# Patient Record
Sex: FEMALE | Race: OTHER | Employment: UNEMPLOYED | URBAN - METROPOLITAN AREA
[De-identification: names, ages, dates, MRNs, and addresses within clinical notes are randomized per-mention and may not be internally consistent; named-entity substitution may affect disease eponyms.]

---

## 2017-04-12 ENCOUNTER — APPOINTMENT (OUTPATIENT)
Dept: CT IMAGING | Facility: HOSPITAL | Age: 72
DRG: 065 | End: 2017-04-12
Attending: EMERGENCY MEDICINE

## 2017-04-12 ENCOUNTER — APPOINTMENT (OUTPATIENT)
Dept: GENERAL RADIOLOGY | Facility: HOSPITAL | Age: 72
DRG: 065 | End: 2017-04-12
Attending: EMERGENCY MEDICINE

## 2017-04-12 ENCOUNTER — HOSPITAL ENCOUNTER (INPATIENT)
Facility: HOSPITAL | Age: 72
LOS: 6 days | Discharge: NURSING, REHAB OR LONG-TERM CARE | DRG: 065 | End: 2017-04-18
Attending: EMERGENCY MEDICINE | Admitting: HOSPITALIST

## 2017-04-12 DIAGNOSIS — R73.9 HYPERGLYCEMIA: ICD-10-CM

## 2017-04-12 DIAGNOSIS — N17.9 ACUTE RENAL FAILURE, UNSPECIFIED ACUTE RENAL FAILURE TYPE (HCC): ICD-10-CM

## 2017-04-12 DIAGNOSIS — I63.9 ACUTE CVA (CEREBROVASCULAR ACCIDENT) (HCC): Primary | ICD-10-CM

## 2017-04-12 PROCEDURE — 99223 1ST HOSP IP/OBS HIGH 75: CPT | Performed by: HOSPITALIST

## 2017-04-12 PROCEDURE — 71010 XR CHEST AP PORTABLE  (CPT=71010): CPT

## 2017-04-12 PROCEDURE — 70450 CT HEAD/BRAIN W/O DYE: CPT

## 2017-04-12 RX ORDER — ONDANSETRON 2 MG/ML
4 INJECTION INTRAMUSCULAR; INTRAVENOUS EVERY 6 HOURS PRN
Status: DISCONTINUED | OUTPATIENT
Start: 2017-04-12 | End: 2017-04-18

## 2017-04-12 RX ORDER — ACETAMINOPHEN 325 MG/1
650 TABLET ORAL EVERY 4 HOURS PRN
Status: DISCONTINUED | OUTPATIENT
Start: 2017-04-12 | End: 2017-04-18

## 2017-04-12 RX ORDER — ASPIRIN 300 MG
300 SUPPOSITORY, RECTAL RECTAL ONCE
Status: COMPLETED | OUTPATIENT
Start: 2017-04-12 | End: 2017-04-12

## 2017-04-12 RX ORDER — ACETAMINOPHEN 650 MG/1
650 SUPPOSITORY RECTAL EVERY 4 HOURS PRN
Status: DISCONTINUED | OUTPATIENT
Start: 2017-04-12 | End: 2017-04-18

## 2017-04-12 RX ORDER — SODIUM PHOSPHATE, DIBASIC AND SODIUM PHOSPHATE, MONOBASIC 7; 19 G/133ML; G/133ML
1 ENEMA RECTAL ONCE AS NEEDED
Status: ACTIVE | OUTPATIENT
Start: 2017-04-12 | End: 2017-04-12

## 2017-04-12 RX ORDER — HEPARIN SODIUM 5000 [USP'U]/ML
5000 INJECTION, SOLUTION INTRAVENOUS; SUBCUTANEOUS EVERY 12 HOURS
Status: DISCONTINUED | OUTPATIENT
Start: 2017-04-12 | End: 2017-04-18

## 2017-04-12 RX ORDER — POLYETHYLENE GLYCOL 3350 17 G/17G
17 POWDER, FOR SOLUTION ORAL DAILY PRN
Status: DISCONTINUED | OUTPATIENT
Start: 2017-04-12 | End: 2017-04-18

## 2017-04-12 RX ORDER — SODIUM CHLORIDE 9 MG/ML
INJECTION, SOLUTION INTRAVENOUS CONTINUOUS
Status: DISCONTINUED | OUTPATIENT
Start: 2017-04-12 | End: 2017-04-13

## 2017-04-12 RX ORDER — NIFEDIPINE 60 MG/1
60 TABLET, FILM COATED, EXTENDED RELEASE ORAL DAILY
Status: ON HOLD | COMMUNITY
End: 2017-04-18

## 2017-04-12 RX ORDER — DOCUSATE SODIUM 100 MG/1
100 CAPSULE, LIQUID FILLED ORAL 2 TIMES DAILY
Status: DISCONTINUED | OUTPATIENT
Start: 2017-04-12 | End: 2017-04-18

## 2017-04-12 RX ORDER — ONDANSETRON 2 MG/ML
4 INJECTION INTRAMUSCULAR; INTRAVENOUS EVERY 6 HOURS PRN
Status: DISCONTINUED | OUTPATIENT
Start: 2017-04-12 | End: 2017-04-12

## 2017-04-12 RX ORDER — BISACODYL 10 MG
10 SUPPOSITORY, RECTAL RECTAL
Status: DISCONTINUED | OUTPATIENT
Start: 2017-04-12 | End: 2017-04-18

## 2017-04-12 NOTE — PROGRESS NOTES
04/12/17 1733   Clinical Encounter Type   Visited With Family   Continue Visiting Yes   Crisis Visit (Stroke Alert)   Patient's Supportive Strategies/Resources Daughter-in-law    Family Spiritual Encounters   Family Coping Anxiety; Fearful;Open/discussio

## 2017-04-12 NOTE — ED PROVIDER NOTES
Patient Seen in: [facility] AND [facility] Emergency Department    History   Patient presents with:  Stroke (neurologic)    Stated Complaint: stroke alert    HPI    Pt is 71 yo Setswana speaking Female who presents from home with left-sided weakness and slurred s 04/12/17 1734 None (Room air)       Current:/66 mmHg  Pulse 88  Temp(Src) 97.3 °F (36.3 °C) (Temporal)  Resp 18  Ht 162.6 cm (5' 4\")  Wt 75.3 kg  BMI 28.48 kg/m2  SpO2 98%        Physical Exam   Constitutional: She appears well-developed and well-no Abnormality         Status                     ---------                               -----------         ------                     CBC W/ DIFFERENTIAL[[id number]]          Abnormal            Final result                 Please view results for these sylvie follow-up provider specified. Medications Prescribed:  There are no discharge medications for this patient.       Present on Admission           ICD-10-CM Noted POA    Acute CVA (cerebrovascular accident) ([address] I63.9 4/12/2017 Unknown

## 2017-04-12 NOTE — ED INITIAL ASSESSMENT (HPI)
Pt presents to er via medics with c/o left sided weakness and slurred speech that began 2 hours pta.

## 2017-04-12 NOTE — ED NOTES
Pt interacting normally with family without difficulty. No new neuro deficits noted at this time. Pt able to move left arm up more but seems still weaker than the right. No pain or distress seen. Fall precautions maintained.   Awaiting new room assignme

## 2017-04-13 ENCOUNTER — APPOINTMENT (OUTPATIENT)
Dept: MRI IMAGING | Facility: HOSPITAL | Age: 72
DRG: 065 | End: 2017-04-13
Attending: HOSPITALIST

## 2017-04-13 ENCOUNTER — APPOINTMENT (OUTPATIENT)
Dept: CV DIAGNOSTICS | Facility: HOSPITAL | Age: 72
DRG: 065 | End: 2017-04-13
Attending: HOSPITALIST

## 2017-04-13 PROCEDURE — 99233 SBSQ HOSP IP/OBS HIGH 50: CPT | Performed by: HOSPITALIST

## 2017-04-13 PROCEDURE — 70549 MR ANGIOGRAPH NECK W/O&W/DYE: CPT

## 2017-04-13 PROCEDURE — 99232 SBSQ HOSP IP/OBS MODERATE 35: CPT | Performed by: INTERNAL MEDICINE

## 2017-04-13 PROCEDURE — 93307 TTE W/O DOPPLER COMPLETE: CPT

## 2017-04-13 PROCEDURE — 93307 TTE W/O DOPPLER COMPLETE: CPT | Performed by: INTERNAL MEDICINE

## 2017-04-13 PROCEDURE — 70551 MRI BRAIN STEM W/O DYE: CPT

## 2017-04-13 PROCEDURE — 70544 MR ANGIOGRAPHY HEAD W/O DYE: CPT

## 2017-04-13 PROCEDURE — 99255 IP/OBS CONSLTJ NEW/EST HI 80: CPT | Performed by: OTHER

## 2017-04-13 RX ORDER — NIFEDIPINE 60 MG/1
60 TABLET, EXTENDED RELEASE ORAL ONCE
Status: COMPLETED | OUTPATIENT
Start: 2017-04-13 | End: 2017-04-13

## 2017-04-13 RX ORDER — ACETAMINOPHEN 325 MG/1
650 TABLET ORAL EVERY 6 HOURS PRN
Status: DISCONTINUED | OUTPATIENT
Start: 2017-04-13 | End: 2017-04-18

## 2017-04-13 RX ORDER — DEXTROSE MONOHYDRATE 25 G/50ML
50 INJECTION, SOLUTION INTRAVENOUS AS NEEDED
Status: DISCONTINUED | OUTPATIENT
Start: 2017-04-13 | End: 2017-04-18

## 2017-04-13 RX ORDER — ATORVASTATIN CALCIUM 40 MG/1
40 TABLET, FILM COATED ORAL NIGHTLY
Status: DISCONTINUED | OUTPATIENT
Start: 2017-04-13 | End: 2017-04-13 | Stop reason: DRUGHIGH

## 2017-04-13 RX ORDER — ASPIRIN 300 MG
300 SUPPOSITORY, RECTAL RECTAL DAILY
Status: DISCONTINUED | OUTPATIENT
Start: 2017-04-13 | End: 2017-04-14

## 2017-04-13 RX ORDER — ATORVASTATIN CALCIUM 40 MG/1
80 TABLET, FILM COATED ORAL NIGHTLY
Status: DISCONTINUED | OUTPATIENT
Start: 2017-04-13 | End: 2017-04-18

## 2017-04-13 RX ORDER — SODIUM CHLORIDE 9 MG/ML
INJECTION, SOLUTION INTRAVENOUS CONTINUOUS
Status: DISCONTINUED | OUTPATIENT
Start: 2017-04-13 | End: 2017-04-15

## 2017-04-13 RX ORDER — LORAZEPAM 2 MG/ML
1 INJECTION INTRAMUSCULAR
Status: COMPLETED | OUTPATIENT
Start: 2017-04-13 | End: 2017-04-13

## 2017-04-13 RX ORDER — ASPIRIN 325 MG
325 TABLET ORAL DAILY
Status: DISCONTINUED | OUTPATIENT
Start: 2017-04-13 | End: 2017-04-14

## 2017-04-13 NOTE — CONSULTS
[facility]    PATIENT'S NAME: Cale Ayala   ATTENDING PHYSICIAN: Kelsey Uriostegui MD   CONSULTING PHYSICIAN: Dania Guy DO   PATIENT ACCOUNT#:   [de-identified]    LOCATION:  [address] [place] [address] RECORD #:   [id number]       DATE OF BIRTH:  [birth year] been consulted in order to assess the patient's functional status and make recommendations for therapy. The patient was seen and examined with family at bedside. The patient is Estonian speaking. She denies any pain complaints.   No difficulty breathing oropharynx noted. Normal phonic voice. NECK:  Supple, symmetric. Trachea midline. No thyromegaly appreciated. LUNGS:  Nonlabored breathing on room air. No wheezing appreciated. HEART:  Regular rate with trace edema noted on the lower extremities.   A stroke rehab.   Based on the patient's current functional status with ongoing medical needs, the patient would benefit from stroke rehab working with PT, OT, and Speech to upgrade her ADLs, functional ability, balance, strength, endurance, self-cares, trans

## 2017-04-13 NOTE — PHYSICAL THERAPY NOTE
PHYSICAL THERAPY EVALUATION - INPATIENT     Room Number: 215/215-A  Evaluation Date: 4/13/2017  Type of Evaluation: Initial          Reason for Therapy: Mobility Dysfunction and Discharge Planning    PHYSICAL THERAPY ASSESSMENT     Patient is a 70 year and strength are within functional limits except for the following:   LUE limited AROM    Lower extremity ROM is within functional limits except for the following:  LLE limited AROM    Lower extremity strength is within functional limits except for the fol is able to ambulate 150 feet with assist device: walker - rolling at assistance level: independent   Goal #3   Current Status    Goal #4 Patient will negotiate 12 stairs/one curb w/ assistive device and supervision   Goal #4   Current Status

## 2017-04-13 NOTE — PROGRESS NOTES
[facility]    Progress Note    Mancil Cea Patient Status:  Inpatient    10/13/1945 MRN [id number]   Location [facility] 2W/SW Attending Valerie Donato MD   Hosp Day # 1 PCP Asad Flores MD     Subjective:  L weakness and facial dr bone. 3. Findings were discussed with Dr. Gayle Garcia at 1735 hrs. Ekg 12-lead    4/12/2017  ECG Report  Interpretation  -------------------------- Sinus Rhythm - Diffuse nonspecific T-abnormality.  ABNORMAL No previous ECGs available Electronically s

## 2017-04-13 NOTE — PLAN OF CARE
Diabetes/Glucose Control    • Glucose maintained within prescribed range Progressing        NEUROLOGICAL - ADULT    • Achieves stable or improved neurological status Progressing    • Absence of seizures Progressing    • Remains free of injury related to se

## 2017-04-13 NOTE — PLAN OF CARE
Diabetes/Glucose Control    • Glucose maintained within prescribed range Progressing          NEUROLOGICAL - ADULT    • Achieves stable or improved neurological status Progressing    • Absence of seizures Progressing    • Remains free of injury related to

## 2017-04-13 NOTE — OCCUPATIONAL THERAPY NOTE
OCCUPATIONAL THERAPY EVALUATION - INPATIENT     Room Number: 215/215-A  Evaluation Date: 4/13/2017  Type of Evaluation: Initial  Presenting Problem:  (L sided weakness)    Physician Order: IP Consult to Occupational Therapy  Reason for Therapy: ADL/IADL Dy SITUATION  Type of Home: House  Home Layout: One level  Lives With: Family      Stairs in Home: 5 steps to enter  Use of Assistive Device(s): Patient was independent with all self-care    Prior Level of [place]: Patient lives at home with family.  She Scale): 33.39  CMS Modifier (G-Code): CK    FUNCTIONAL TRANSFER ASSESSMENT  Supine to Sit : Moderate assistance  Sit to Stand:  Moderate assistance    Toilet Transfer: mod a   Shower Transfer: NT  Chair Transfer: mod a     Bedroom Mobility: mod a with RW

## 2017-04-13 NOTE — SLP NOTE
ADULT SWALLOWING EVALUATION    ASSESSMENT & PLAN   ASSESSMENT  Pt seen sitting upright in bed for all PO trials. L sided weakness and facial droop noted upon entrance for session. Pt's family translated to patient during BSSE.  L sided labial spillage on joseph Intact  Bilabial Seal: Impaired  Bolus Formation: Impaired  Bolus Propulsion: Impaired  Mastication: Impaired       Pharyngeal Phase of Swallow: Impaired  Laryngeal Elevation Timing: Impaired  Laryngeal Elevation Strength: Impaired  Laryngeal Elevation  therapy;Communication evaluation  Number of Visits to Meet Established Goals: 5  Follow Up Needed: Yes  SLP Follow-up Date: 04/14/17    Thank you for your referral.   Anuj Meyers MA, [address] Pathologist  [address] [facility] [place] [facility] [place]

## 2017-04-13 NOTE — DISCHARGE PLANNING
HILARY following up on d/c planning for the patient. She is currently in CCU and HILARY attempted to meet with her at bedside, but she was not in the room. HILARY will follow-up at a later time.   She is listed as self-pay and a referral was placed to patient financi

## 2017-04-13 NOTE — PROGRESS NOTES
[facility]    Diabetes Education  Note    Meche Coffey Patient Status:  Inpatient   10/13/1945 MRN [id number]  Location [facility] 3W/SW Attending Gerri Dey MD  Hosp Day # 1 PCP Alonso Sandoval MD    Reason for Visit:    Pt with e

## 2017-04-13 NOTE — H&P
[facility]    PATIENT'S NAME: Anne Fuller PHYSICIAN: Mary Carmen Qureshi MD   PATIENT ACCOUNT#:   [id number]    LOCATION:  [place] [address]  MEDICAL RECORD #:   [id number]       YOB: 1945  ADMISSION DATE:       04/12/2017 slightly improved. Other 12-point review of systems is negative. PHYSICAL EXAMINATION:    GENERAL:  Alert and oriented to time, place and person. No acute distress.     VITAL SIGNS:  Temperature 97.3, pulse 88, respiratory rate 18, blood pressure 1

## 2017-04-13 NOTE — CONSULTS
PM&R Consult dictated. Recommendations: Acute Inpatient Stroke Rehabilitation    SLP cog eval ordered.  Awaiting SLP swallow eval.    Thank you for this consultation,  Dharmesh Shetty, [address] [facility]

## 2017-04-13 NOTE — CONSULTS
[facility]    Report of Consultation    Virgen Mcqueen Patient Status:  Inpatient    10/13/1945 MRN [id number]   Location [facility] 2W/SW Attending Greta Acuna MD   [facility] Day # 1 PCP Cipriano Clancy MD     Date of Admission:   (MIRALAX) powder packet 17 g, 17 g, Oral, Daily PRN  •  bisacodyl (DULCOLAX) rectal suppository 10 mg, 10 mg, Rectal, Daily PRN  •  acetaminophen (TYLENOL) tab 650 mg, 650 mg, Oral, Q4H PRN **OR** acetaminophen (TYLENOL) 650 MG rectal suppository 650 mg, 6

## 2017-04-13 NOTE — CONSULTS
[facility]    [facility] PHYSICIAN: Terrance Tomlin MD   CONSULTING PHYSICIAN: Berry Stewart MD   PATIENT ACCOUNT#:   [id number]    LOCATION:  [address] [place] [address] RECORD #:   [id number]       DATE OF BIRTH:  [birth year] light. Optic discs are flat. Visual fields appear to be full to confrontation. Left central facial weakness. Moderate to marked weakness left arm and left leg, 1.5/5. Strength of right arm and right leg normal.  No carotid bruits.   Deep tendon reflexe

## 2017-04-14 PROCEDURE — 99233 SBSQ HOSP IP/OBS HIGH 50: CPT | Performed by: HOSPITALIST

## 2017-04-14 PROCEDURE — 99232 SBSQ HOSP IP/OBS MODERATE 35: CPT | Performed by: INTERNAL MEDICINE

## 2017-04-14 RX ORDER — ASPIRIN 325 MG
325 TABLET ORAL DAILY
Qty: 30 TABLET | Refills: 0 | Status: SHIPPED | OUTPATIENT
Start: 2017-04-14 | End: 2017-04-14

## 2017-04-14 RX ORDER — 0.9 % SODIUM CHLORIDE 0.9 %
VIAL (ML) INJECTION
Status: COMPLETED
Start: 2017-04-14 | End: 2017-04-14

## 2017-04-14 RX ORDER — ASPIRIN 81 MG/1
81 TABLET ORAL DAILY
Qty: 30 TABLET | Refills: 0 | Status: SHIPPED | OUTPATIENT
Start: 2017-04-14 | End: 2017-04-17

## 2017-04-14 RX ORDER — LABETALOL HYDROCHLORIDE 5 MG/ML
10 INJECTION, SOLUTION INTRAVENOUS EVERY 4 HOURS PRN
Status: DISCONTINUED | OUTPATIENT
Start: 2017-04-14 | End: 2017-04-18

## 2017-04-14 RX ORDER — ASPIRIN 81 MG/1
81 TABLET ORAL DAILY
Status: DISCONTINUED | OUTPATIENT
Start: 2017-04-14 | End: 2017-04-15

## 2017-04-14 RX ORDER — ATORVASTATIN CALCIUM 80 MG/1
80 TABLET, FILM COATED ORAL NIGHTLY
Qty: 30 TABLET | Refills: 0 | Status: SHIPPED | OUTPATIENT
Start: 2017-04-14 | End: 2017-04-18

## 2017-04-14 NOTE — PLAN OF CARE
Transfer note: pt transferred per bed to room 343. Pt arrives with family and her meal. Pt is able to answer questions in Slovak that are appropriate, pts left hand is flaccid and left leg moves poorly. Skin check is done.  Has a Mepilex in place to sacruu

## 2017-04-14 NOTE — PROGRESS NOTES
[facility]    Progress Note    Beena Prabhakar Patient Status:  Inpatient    10/13/1945 MRN [id number]   Location [facility] 3W/SW Attending Demetrice Garcia MD   Hosp Day # 2 PCP Dennys Gunderson MD     Subjective:  Feels better    Diabet

## 2017-04-14 NOTE — SLP NOTE
SPEECH DAILY NOTE - INPATIENT    Evaluation Date: 04/14/2017    ASSESSMENT & PLAN   ASSESSMENT  Pt seen upright in bed with family at bedside. Family assisted with translation as pt's native/dominant language is [place]  Pt with no overt s/s of aspiration 100 % accuracy over 3 session(s).   4/14: pt tolerated mech soft solid and nectar thick liquids with no cough, no throat clear and clear vocal quality. No overt s/s of apsiration. GOAL MET, to be discontinued.     Goal #2  The patient/family/caregiver will

## 2017-04-14 NOTE — PROGRESS NOTES
[facility]    Progress Note    Donovan Tobar Patient Status:  Inpatient    10/13/1945 MRN [id number]   Location [facility] 2W/SW Attending Shravan Hawley MD   [address] Day # 2 PCP Krystian Benítez MD     Subjective:  She thinks L weakness an occlusion or dissection. 2. Atheromatous changes proximal left M1 segment with mild/moderate proximal left M1 stenosis. 3. Atheromatous changes distal right M2/MCA segment branch with moderate diameter stenotic segment.  4. Mild atheromatous changes and lum Xiang Stewart    Medications:  • Atorvastatin Calcium  80 mg Oral Nightly   • aspirin  300 mg Rectal Daily    Or   • aspirin  325 mg Oral Daily   • insulin detemir  15 Units Subcutaneous Daily   • insulin aspart  1-5 Units Subcutaneous TID CC   • Heparin Sodium (Por

## 2017-04-14 NOTE — DISCHARGE PLANNING
4/14COscar approved to give the Patient's family the Jounce application. This Writer met with the Patient's family at bedside. This Writer provide the Patient with children with the zulay application.  This Writer inform the Patient's children that

## 2017-04-14 NOTE — DIETARY NOTE
DIABETES NUTRITION ASSESSMENT    Patient educated regarding diabetes diet basics. Pt Cook Islander speaking, family interpreted for pt. Discussed carbohydrate foods and portion sizes. Handout given in Cook Islander and initial meal plan provided.  Encouraged to attend

## 2017-04-14 NOTE — OCCUPATIONAL THERAPY NOTE
OCCUPATIONAL THERAPY TREATMENT NOTE - INPATIENT     Room Number: 343/343-A    Presenting Problem:  (L sided weakness)    Problem List  Principal Problem:    Acute CVA (cerebrovascular accident) ([address]  Active Problems:    Hyperglycemia    Acute renal failur washing, rinsing, drying)?: A Lot  -   Toileting, which includes using toilet, bedpan or urinal? : A Lot  -   Putting on and taking off regular upper body clothing?: A Lot  -   Taking care of personal grooming such as brushing teeth?: A Little  -   Eating

## 2017-04-14 NOTE — PLAN OF CARE
Altered Communication/Language Barrier    • Patient/Family is able to understand and participate in their care Progressing    Patient is Guatemalan speaking. Family is at bedside to translate for patient.      CARDIOVASCULAR - ADULT    • Maintains optimal card

## 2017-04-14 NOTE — SLP NOTE
ADULT SWALLOWING EVALUATION    ASSESSMENT & PLAN   ASSESSMENT  Pt upright in bed for BDAE assessment. This evaluation was completed informally due to SLP needing to translate the test and modify some elements that were unable to be translated verbatim.  Pt [facility])        Prior Living Situation: Home with support  Diet Prior to Admission: Regular; Thin liquids  Precautions: Aspiration    Comments: Pt presents with mild-moderate dysarthria characterized by reduced rate of speech and left sided facial symmetry. rate of speech, and will use compensatory strategies such as slowed rate and over-articulation, given 1-2 verbal cues by slp, 90% of opportunities. FOLLOW UP  Treatment Plan: Dysphagia therapy; Dysarthria therapy  Number of Visits to Meet Established G

## 2017-04-14 NOTE — PHYSICAL THERAPY NOTE
PHYSICAL THERAPY TREATMENT NOTE - INPATIENT    Room Number: 343/343-A       Presenting Problem: CVA    Problem List  Principal Problem:    Acute CVA (cerebrovascular accident) [address] [address]  Active Problems:    Hyperglycemia    Acute renal failure, unspecified ac +    ACTIVITY TOLERANCE  Room air    AM-PAC '6-Clicks' INPATIENT SHORT FORM - BASIC MOBILITY  How much difficulty does the patient currently have. ..  -   Turning over in bed (including adjusting bedclothes, sheets and blankets)?: None   -   Sitting down on Status  Not assessed

## 2017-04-15 PROCEDURE — 99232 SBSQ HOSP IP/OBS MODERATE 35: CPT | Performed by: INTERNAL MEDICINE

## 2017-04-15 PROCEDURE — 99233 SBSQ HOSP IP/OBS HIGH 50: CPT | Performed by: HOSPITALIST

## 2017-04-15 PROCEDURE — 99232 SBSQ HOSP IP/OBS MODERATE 35: CPT | Performed by: OTHER

## 2017-04-15 RX ORDER — HYDRALAZINE HYDROCHLORIDE 20 MG/ML
10 INJECTION INTRAMUSCULAR; INTRAVENOUS EVERY 4 HOURS PRN
Status: DISCONTINUED | OUTPATIENT
Start: 2017-04-15 | End: 2017-04-18

## 2017-04-15 RX ORDER — 0.9 % SODIUM CHLORIDE 0.9 %
VIAL (ML) INJECTION
Status: COMPLETED
Start: 2017-04-15 | End: 2017-04-15

## 2017-04-15 RX ORDER — NIFEDIPINE 60 MG/1
60 TABLET, EXTENDED RELEASE ORAL DAILY
Status: DISCONTINUED | OUTPATIENT
Start: 2017-04-15 | End: 2017-04-18

## 2017-04-15 RX ORDER — CLOPIDOGREL BISULFATE 75 MG/1
75 TABLET ORAL DAILY
Status: DISCONTINUED | OUTPATIENT
Start: 2017-04-15 | End: 2017-04-18

## 2017-04-15 NOTE — PROGRESS NOTES
[facility]    Progress Note    Liliana King Patient Status:  Inpatient    10/13/1945 MRN [id number]   Location [facility] 3W/SW Attending Tayo Glass MD   Hosp Day # 3 PCP Bryan Wayne MD     Subjective:  Feels better    Diabet

## 2017-04-15 NOTE — PROGRESS NOTES
[facility]    Progress Note    Jin Platt Patient Status:  Inpatient    10/13/1945 MRN [id number]   Location [facility] 2W/SW Attending Chip Canas MD   [facility] Day # 3 PCP Sixto Hernandez MD     Subjective:  She thinks L weakness an aneurysm. No vascular occlusion or dissection. 2. Atheromatous changes proximal left M1 segment with mild/moderate proximal left M1 stenosis. 3. Atheromatous changes distal right M2/MCA segment branch with moderate diameter stenotic segment.  4. Mild athero neurology and PM&R consultations  - con't asa  - statin when able to take PO  - PT/OT/ST  - cleared for modified diet  - MRI/MRA, echo results reviewed    HTN   - monitor    DM2, poorly controlled. hgba1c 12.2  - appreciate endocrinology management  - con'

## 2017-04-15 NOTE — PLAN OF CARE
Altered Communication/Language Barrier    • Patient/Family is able to understand and participate in their care Progressing    Patient's primary language is Icelandic.  Family is at bedside to translate    CARDIOVASCULAR - ADULT    • Maintains optimal cardiac

## 2017-04-15 NOTE — PROGRESS NOTES
Neurology Inpatient Follow-up Note      HPI:     Patient is being seen in follow-up. Interval history reviewed. Family is at bedside.       Past Medical Hisotory:  Reviewed    Medications:  Reviewed    Allergies:  No Known Allergies      ROS:   HEENT: Dys moderate diameter stenotic segment. 4. Mild atheromatous changes and luminal narrowing involving the cavernous portions of internal carotid arteries.  Mild atheromatous narrowing at the origin the left vertebral without significant stenosis      MRA caroti

## 2017-04-16 PROCEDURE — 99232 SBSQ HOSP IP/OBS MODERATE 35: CPT | Performed by: INTERNAL MEDICINE

## 2017-04-16 PROCEDURE — 99233 SBSQ HOSP IP/OBS HIGH 50: CPT | Performed by: HOSPITALIST

## 2017-04-16 NOTE — PLAN OF CARE
Altered Communication/Language Barrier    • Patient/Family is able to understand and participate in their care Progressing    Luxembourgish speaking, this RN speeaks Luxembourgish, daughter at bedside     CARDIOVASCULAR - ADULT    • Maintains optimal cardiac output an

## 2017-04-16 NOTE — PROGRESS NOTES
[facility]    Progress Note    Donovan Tobar Patient Status:  Inpatient    10/13/1945 MRN [id number]   Location [facility] 3W/SW Attending Shravan Hawley MD   Hosp Day # 4 PCP Krystian Benítez MD     Subjective:  Feels better.  Eating l

## 2017-04-16 NOTE — PROGRESS NOTES
[facility]    Progress Note    Esther Jane Patient Status:  Inpatient    10/13/1945 MRN [id number]   Location [facility] 3W/SW Attending Arnoldo Flores MD   Hosp Day # 5 PCP Can Coley MD     Subjective:  Feels good.     Diabete

## 2017-04-16 NOTE — PLAN OF CARE
Altered Communication/Language Barrier    • Patient/Family is able to understand and participate in their care Progressing    Amharic speaking     CARDIOVASCULAR - ADULT    • Maintains optimal cardiac output and hemodynamic stability Progressing    • Absen

## 2017-04-16 NOTE — PHYSICAL THERAPY NOTE
PHYSICAL THERAPY TREATMENT NOTE - INPATIENT    Room Number: 343/343-A     Session:   Number of Visits to Meet Established Goals: 10    Presenting Problem: CVA    Problem List  Principal Problem:    Acute CVA (cerebrovascular accident) ([facility] [address]  Active [address]  Assistive Device: Rolling walker  Pattern:  (narrow base of support)  Stoop/Curb Assistance: Not tested       Skilled Therapy Provided: Therapuetic ex, bed mobility, transfers and gait training.     THERAPEUTIC EXERCISES  Lower Extremity Ankle pumps, L independent     Goal #3    Current Status   Min assist x 1 with RW x 161 ft and 1 seated rest break in between.    Goal #4   Patient will negotiate 12 stairs/one curb w/ assistive device and supervision     Goal #4    Current Status   Not assessed

## 2017-04-17 PROCEDURE — 99233 SBSQ HOSP IP/OBS HIGH 50: CPT | Performed by: HOSPITALIST

## 2017-04-17 PROCEDURE — 99232 SBSQ HOSP IP/OBS MODERATE 35: CPT | Performed by: INTERNAL MEDICINE

## 2017-04-17 RX ORDER — CLOPIDOGREL BISULFATE 75 MG/1
75 TABLET ORAL DAILY
Refills: 0 | Status: SHIPPED | COMMUNITY
Start: 2017-04-17 | End: 2017-04-18

## 2017-04-17 RX ORDER — PANTOPRAZOLE SODIUM 40 MG/1
40 TABLET, DELAYED RELEASE ORAL
Refills: 0 | Status: SHIPPED | COMMUNITY
Start: 2017-04-17 | End: 2017-04-18

## 2017-04-17 RX ORDER — PANTOPRAZOLE SODIUM 40 MG/1
40 TABLET, DELAYED RELEASE ORAL
Status: DISCONTINUED | OUTPATIENT
Start: 2017-04-17 | End: 2017-04-18

## 2017-04-17 RX ORDER — IPRATROPIUM BROMIDE AND ALBUTEROL SULFATE 2.5; .5 MG/3ML; MG/3ML
SOLUTION RESPIRATORY (INHALATION)
Status: DISPENSED
Start: 2017-04-17 | End: 2017-04-18

## 2017-04-17 NOTE — DISCHARGE PLANNING
SW was informed by RN that pt's dtr had completed the zulay application for [facility].     HILARY spoke w/ PILAR/Enma who stated that SW scan fax application to [phone].  [facility]IL[facility]/Enma stated that pt would need to be set up w/ NICOL, Pratibha Delaney, and meds from hospitals

## 2017-04-17 NOTE — OCCUPATIONAL THERAPY NOTE
OCCUPATIONAL THERAPY TREATMENT NOTE - INPATIENT     Room Number: 343/343-A          Presenting Problem:  (L sided weakness)    Problem List  Principal Problem:    Acute CVA (cerebrovascular accident) ([address]  Active Problems:    Hyperglycemia    Acute renal activities; Energy conservation/work simplification techniques;ADL training;Functional transfer training;UE strengthening/ROM; Endurance training;Patient/Family education;Patient/Family training;Equipment eval/education; Fine motor coordination activities; Com  Comment:  Progressing-min.  A needed                  Goals  on:   Frequency: 5-7x a week               Carmencita Eckert OTR/JAREN 2017

## 2017-04-17 NOTE — PHYSICAL THERAPY NOTE
PHYSICAL THERAPY TREATMENT NOTE - INPATIENT    Room Number: 343/343-A       Presenting Problem: CVA    Problem List  Principal Problem:    Acute CVA (cerebrovascular accident) ([facility] [address]  Active Problems:    Hyperglycemia    Acute renal failure, unspecified ac Assessment   Gait Assistance: Minimum assistance  Distance (ft):  (75ftx2)  Assistive Device: Rolling walker  Pattern:  (narrow base of support)  Stoop/Curb Assistance: Not tested       THERAPEUTIC EXERCISES  Lower Extremity marching, toe taps, NBOS EC 3 t

## 2017-04-17 NOTE — PROGRESS NOTES
[facility]    Progress Note    Meche Coffey Patient Status:  Inpatient    10/13/1945 MRN [id number]   Location [facility] 3W/SW Attending Gerri Dey MD   Hosp Day # 5 PCP Alonso Sandoval MD     Subjective:  +diarrhea starting overn Meds: hydrALAzine HCl, Labetalol HCl, acetaminophen, dextrose, Glucose-Vitamin C, ondansetron HCl, PEG 3350, bisacodyl, acetaminophen **OR** acetaminophen     Assessment and Plan:     Acute CVA  - appreciate neurology and PM&R consultations  - con't asa  -

## 2017-04-18 VITALS
OXYGEN SATURATION: 93 % | SYSTOLIC BLOOD PRESSURE: 132 MMHG | HEART RATE: 81 BPM | BODY MASS INDEX: 28.66 KG/M2 | HEIGHT: 64 IN | RESPIRATION RATE: 18 BRPM | DIASTOLIC BLOOD PRESSURE: 61 MMHG | WEIGHT: 167.88 LBS | TEMPERATURE: 99 F

## 2017-04-18 PROCEDURE — 99232 SBSQ HOSP IP/OBS MODERATE 35: CPT | Performed by: INTERNAL MEDICINE

## 2017-04-18 PROCEDURE — 99239 HOSP IP/OBS DSCHRG MGMT >30: CPT | Performed by: HOSPITALIST

## 2017-04-18 RX ORDER — NIFEDIPINE 60 MG/1
60 TABLET, FILM COATED, EXTENDED RELEASE ORAL DAILY
Qty: 30 TABLET | Refills: 0 | Status: SHIPPED | OUTPATIENT
Start: 2017-04-18 | End: 2017-05-18

## 2017-04-18 RX ORDER — ATORVASTATIN CALCIUM 80 MG/1
80 TABLET, FILM COATED ORAL NIGHTLY
Qty: 30 TABLET | Refills: 0 | Status: SHIPPED | OUTPATIENT
Start: 2017-04-18

## 2017-04-18 RX ORDER — PANTOPRAZOLE SODIUM 40 MG/1
40 TABLET, DELAYED RELEASE ORAL
Qty: 30 TABLET | Refills: 0 | Status: SHIPPED | OUTPATIENT
Start: 2017-04-18

## 2017-04-18 RX ORDER — CLOPIDOGREL BISULFATE 75 MG/1
75 TABLET ORAL DAILY
Qty: 30 TABLET | Refills: 0 | Status: SHIPPED | OUTPATIENT
Start: 2017-04-18 | End: 2017-05-18

## 2017-04-18 NOTE — PLAN OF CARE
PT. WILL TRANSFER TO [facility] LATER TODAY,  MAKING ARRANGEMENTS AND FILLING ALL RX FOR 30 DAYS PER THE DEAL WITH Carol Fitzpatrick

## 2017-04-18 NOTE — SLP NOTE
SPEECH DAILY NOTE - INPATIENT        ASSESSMENT & PLAN   ASSESSMENT      Pt seen upright in chair with current diet of mechanical soft/thin liquids for monitoring of diet tolerance. Swallowing precautions/strategies discussed and demonstrated.  Daughter pr understanding of aspiration precautions. Pt able to self-cue for all precautions while self-feeding.      GOAL MET   Goal #2  The patient will tolerate trial upgrade of hard solid consistency and thin liquids without overt signs or symptoms of aspiration wi

## 2017-04-18 NOTE — PLAN OF CARE
P.T./O.T EVALS COMPLETE,  WORKING WITH AALIYAH HIGGINS FOR TRANSFER ONCE ALL DOCUMENTS IN PLACE/ROOM AVAILABLE

## 2017-04-18 NOTE — DISCHARGE PLANNING
4/18/17 CM Discharge planning   Spoke with Abel abreu at [facility], they have accepted pt under their [facility] for 2 weeks. Bed available today at 5:30 pm, room 1114, RN report [phone].   Met with pt and dtr, agreed to above rehab trans

## 2017-05-02 NOTE — DISCHARGE SUMMARY
[facility] HOSPITALIST  DISCHARGE SUMMARY     Joseph Napoles Patient Status:  Inpatient    10/13/1945 MRN [id number]   Location [facility] 3W/SW Attending No att. providers found   Hosp Day # 6 PCP Karin Boston MD     Date of Admission: 2017 endocrinology management  - con't insulin and adjust as needed    CKD stage 3  - monitor    Anemia  - checked iron, vit b12, folate  - stool hemoccult positive  - as hgb stable, can f/u GI as outpatient    DVT proph: heparin sq    Dispo: rehab            D total) by mouth every morning before breakfast.    Quantity:  30 tablet   Refills:  0         CHANGE how you take these medications       Instructions Prescription details    MetFORMIN HCl 500 MG Tabs   Commonly known as:  GLUCOPHAGE   What changed:  when edema.  -----------------------------------------------------------------------------------------------  PATIENT DISCHARGE INSTRUCTIONS: See electronic chart  Hospital Discharge Diagnoses: CVA    TCM Diagnosis at discharge from Hospital: Other: CVA; no TCM

## 2017-05-09 ENCOUNTER — LAB REQUISITION (OUTPATIENT)
Dept: LAB | Facility: HOSPITAL | Age: 72
End: 2017-05-09

## 2017-05-09 DIAGNOSIS — I69.352 HEMIPLEGIA AND HEMIPARESIS FOLLOWING CEREBRAL INFARCTION AFFECTING LEFT DOMINANT SIDE (HCC): ICD-10-CM

## 2017-05-09 PROCEDURE — 85018 HEMOGLOBIN: CPT | Performed by: FAMILY MEDICINE

## 2018-08-25 NOTE — HOME CARE LIAISON
MET WITH PATIENT, HER DTR ZIA, ([phone]), AND CONTACTED DTR-IN-LAW TIMOTHY, ([phone]), TO DISCUSS POTENTIAL PLANS AFTER PATIENT IS TRANSFERRED TO [facility]. ALL IN AGREEMENT WITH SERVICES BEING PROVIDED BY [facility], PENDING ORDERS.  PROVIDED RES
Yes

## 2020-01-08 NOTE — PROGRESS NOTES
Family members at bedside to assist with translation, exam.  She relates no new neurological symptoms. Consults of Jb Phan and Levy reviewed and greatly appreciated. Report of MRI, MRAs, echo, labs reviewed.   Discussed with patient and family m LM for patient to c/b

## 2025-05-21 NOTE — PROGRESS NOTES
[facility]    Progress Note    Theresa Galindo Patient Status:  Inpatient    10/13/1945 MRN [id number]   Location [facility] 2W/SW Attending Alyse Stevens MD   [facility] Day # 4 PCP Saúl Sandoval MD     Subjective:  She thinks L weakness an acetaminophen, dextrose, Glucose-Vitamin C, ondansetron HCl, PEG 3350, bisacodyl, acetaminophen **OR** acetaminophen     Assessment and Plan:     Acute CVA  - appreciate neurology and PM&R consultations  - con't asa  - statin   - PT/OT/ST  - cleared for mo no

## (undated) NOTE — IP AVS SNAPSHOT
Patient Demographics     Address Phone E-mail Address    Kiet Rosas [address] [place] [address] [phone] (Home)  [phone] (Mobile) [email or web address]      Emergency Contact(s)     Name Relation Home Work Abdelrahman Relative [phone] Inject 1-5 Units into the skin TID CC and HS.  Give 1 unit for blood glucose 160-200 mg/dL Give 2 units for blood glucose 201-240 mg/dL Give 3 units for blood glucose 241-280 mg/dL Give 4 units for blood glucose 281-320 mg/dL Give 5 units for blood glucose ! Ask your nurse or doctor about these medications    - insulin aspart 100 UNIT/ML Sopn            [id number] - MAR ACTION REPORT  (last 24 hrs)    ** SITE UNKNOWN **     Order ID Medication Name Action Time Action Reason Comments    [id number] Atorvastatin CBC WITH DIFFERENTIAL WITH PLATELET [[id number]]  Resulted: 04/18/17 5930, Result status: Final result    Ordering provider: Alyse Stevens MD  04/17/17 2300 Resulting lab: [facility]    Narrative:        The following orders were created for panel order CB MEDICAL RECORD #:   [id number]       YOB: 1945  ADMISSION DATE:       04/12/2017    HISTORY AND PHYSICAL EXAMINATION    REASON FOR ADMISSION:  Acute cerebrovascular accident.       HISTORY OF PRESENT ILLNESS:  The patient is a 44-year-old H GENERAL:  Alert and oriented to time, place and person. No acute distress. VITAL SIGNS:  Temperature 97.3, pulse 88, respiratory rate 18, blood pressure 151/66, pulse oximetry 98% on room air. HEENT:  Atraumatic. Oropharynx clear.   Moist mucous membr Consults signed by Vandana Ramírez DO at 4/14/2017  6:46 AM      Author:  Vandana Ramírez DO Service:  (none) Author Type:  Physician    Filed:  4/14/2017  6:46 AM Note Time:  4/13/2017  2:31 PM Status:  Signed    :  Vandana Ramírez DO (Physician) several internal carotid arteries. The patient is on aspirin for stroke prevention and will be started on statin when cleared to take p.o. The patient noted to have chronic kidney disease stage 3 as well as anemia with workup pending.   DVT prophylaxis cu GENERAL:  No acute distress. Appears stated age. VITAL SIGNS:  Stable. HEENT:  Head:  Normocephalic, atraumatic without obvious abnormalities. Eyes:  Conjunctivae and lids without erythema or icterus. Extraocular muscles are intact.   Ears, nose, thro 3.   Dysphagia with aspiration risk. 4.   Dysarthria with left facial weakness. 5.   Gait dysfunction with fall risk. 6.   Diabetes mellitus with hemoglobin A1c of 12.2.  7.   Chronic kidney disease stage 3.  8.   Anemia.      IMPAIRMENTS:  ADLs, functio PHYSICAL THERAPY TREATMENT NOTE - INPATIENT    Room Number: 343/343-A       Presenting Problem: CVA    Problem List  Principal Problem:    Acute CVA (cerebrovascular accident) ([facility] [address]  Active Problems:    Hyperglycemia    Acute renal failure, unspecified ac Gait Assessment   Gait Assistance: Minimum assistance  Distance (ft):  (75ftx2)  Assistive Device: Rolling walker  Pattern:  (narrow base of support)  Stoop/Curb Assistance: Not tested       THERAPEUTIC EXERCISES  Lower Extremity marching, toe taps, NBOS E CHOLECYSTECTOMY         SUBJECTIVE  No complaints. Patient’s self-stated goal is to improve strength.     OBJECTIVE  Precautions:  needed    WEIGHT BEARING RESTRICTION  Weight Bearing Restriction: None                PAIN ASSESSMENT   Rating: RN approved participation in therapy. Received patient supine in bed, agreeable to therapy, very pleasant and motivated. Transferred supine to sit, sit to stand with RW and min assist x 1.  Patient was able to ambulate 161 ft with RW with 1 seated rest ernesto Author:  Patrick Dickey OT Service:  (none) Author Type:  Occupational Therapist    Filed:  4/17/2017  3:40 PM Note Time:  4/17/2017  3:25 PM Status:  Signed    :  Patrick Dickey OT (Occupational Therapist)           OCCUPATIONAL THERAPY TREATMENT NOTE Patient End of Session: Up in chair;Needs met;Call light within reach;RN aware of session/findings; All patient questions and concerns addressed; Family present  OT Discharge Recommendations: Acute rehabilitation       PLAN  OT Treatment Plan: Joe Boyce OT Goals:  Patient will complete bed to toilet transfer with RW and min a     Comment:  MET    Patient will complete OFH with bilateral hand integration and min a     Comment:  Progressing    Patient will complete LUE AAROM/AROM HEP with CGA    Comment:  P Goal #1 The patient will complete OMEs targeting Lingual, Labial and Buccal strength, ROM, and coordination with 80% % accuracy within 2-3 session(s). Pt completed OME x10 reps each with adequate rate and ROM for all function with 90% accuracy.  Dtr repo verbalized good understanding of strategies with interpretation by dtr. Pt able to execute all precautions while self-feeding.      GOAL MET        FOLLOW UP  Follow Up Needed: No  SLP Follow-up Date: 04/18/17  Number of Visits to Meet Established Goals: 3

## (undated) NOTE — LETTER
Hospital Discharge Documentation    From: [address] Hospitalist's Office  Phone: [phone]    Patient discharged time/date: 4/18/2017  5:36 PM  Patient discharge disposition:  [facility]  Discharge summary not available at this time, last progr •  [START ON 4/18/2017] insulin detemir   22 Units  Subcutaneous  Daily    •  Clopidogrel Bisulfate   75 mg  Oral  Daily    •  NIFEdipine ER Osmotic Release   60 mg  Oral  Daily    •  Atorvastatin Calcium   80 mg  Oral  Nightly    •  insulin aspart   1-5 U

## (undated) NOTE — IP AVS SNAPSHOT
[address]  [address] [facility] [phone]                Discharge Summary   4/12/2017    [facility]           Admission Information        Provider Department    4/12/2017 Pro Rodrigues MD [facility] 3w/ insulin aspart 100 UNIT/ML Sopn   Last time this was given:  9 Units on 4/18/2017  1:42 PM   Commonly known as:  Kvng Pretty   Next dose due:  4/18/17 with dinner        Inject 1-5 Units into the skin TID CC and HS.  Give 1 unit for blood glucose 160-200 mg/dL Please  your prescriptions at the location directed by your doctor or nurse     Bring a paper prescription for each of these medications    - Atorvastatin Calcium 80 MG Tabs  - Clopidogrel Bisulfate 75 MG Tabs  - insulin aspart 100 UNIT/ML Sopn  - i 26 (04/18/17)  8 (04/18/17)  3 (04/18/17)  0 -- (04/18/17)  4.2 (04/18/17)  1.7 (04/18/17)  0.6 (04/18/17)  0.2 (04/18/17)  0.0    (04/14/17)  82 (04/14/17)  12 (04/14/17)  5 (04/14/17)  1 (04/14/17)  0  (04/14/17)  8.4 (H) (04/14/17)  1.2 (04/14/17)  0.5 Medicaid plans. To get signed up and covered, please call [phone] and ask to get set up for an insurance coverage that is in-network with [facility]. Yenifer     Sign up for [employer], your secure online medical record.   [employer] What to report to your healthcare team:  Dizziness, nausea, chest pain, weakness, numbness           Cholesterol Lowering Medications     Atorvastatin Calcium 80 MG Oral Tab       Use: Lower cholesterol, protect your heart   Most common side effects: Dizzi